# Patient Record
Sex: FEMALE | Race: AMERICAN INDIAN OR ALASKA NATIVE
[De-identification: names, ages, dates, MRNs, and addresses within clinical notes are randomized per-mention and may not be internally consistent; named-entity substitution may affect disease eponyms.]

---

## 2018-03-21 ENCOUNTER — HOSPITAL ENCOUNTER (EMERGENCY)
Dept: HOSPITAL 5 - ED | Age: 28
Discharge: HOME | End: 2018-03-21
Payer: COMMERCIAL

## 2018-03-21 VITALS — DIASTOLIC BLOOD PRESSURE: 70 MMHG | SYSTOLIC BLOOD PRESSURE: 122 MMHG

## 2018-03-21 DIAGNOSIS — F17.200: ICD-10-CM

## 2018-03-21 DIAGNOSIS — J06.9: Primary | ICD-10-CM

## 2018-03-21 DIAGNOSIS — J20.9: ICD-10-CM

## 2018-03-21 PROCEDURE — 87430 STREP A AG IA: CPT

## 2018-03-21 PROCEDURE — 96361 HYDRATE IV INFUSION ADD-ON: CPT

## 2018-03-21 PROCEDURE — 87116 MYCOBACTERIA CULTURE: CPT

## 2018-03-21 PROCEDURE — 99284 EMERGENCY DEPT VISIT MOD MDM: CPT

## 2018-03-21 PROCEDURE — 71046 X-RAY EXAM CHEST 2 VIEWS: CPT

## 2018-03-21 PROCEDURE — 96374 THER/PROPH/DIAG INJ IV PUSH: CPT

## 2018-03-21 PROCEDURE — 94640 AIRWAY INHALATION TREATMENT: CPT

## 2018-03-21 PROCEDURE — 96375 TX/PRO/DX INJ NEW DRUG ADDON: CPT

## 2018-03-21 NOTE — EMERGENCY DEPARTMENT REPORT
Blank Doc





- Documentation


Documentation: 





Patient is a 27-year-old  [place] female who is presenting with cough 

cold congestion body aches fever wheezing nausea vomiting and sore throat for 

the past 2 days.  Patient denies any diarrhea or headache at this time.  On 

brief physical exam patient is wheezing and has uncontrollable cough that 

sounds painful.  Patient will be moved to a treatment room to get IV fluids and 

nausea medicine and a breathing treatment and Solu-Medrol chest x-ray be 

reassessed.

## 2018-03-21 NOTE — EMERGENCY DEPARTMENT REPORT
HPI





- General


Chief Complaint: Upper Respiratory Infection


Time Seen by Provider: 03/21/18 15:07





- HPI


HPI: 





Patient airport cough, body ache, chills.  Says she's been ill since yesterday.

  Denies other medical problems.  Denies taking any medication.  Positive sore 

throat, nausea vomiting and productive cough.  Denies any headache or diarrhea.

  Denies any abdominal pain.  Denies any dizziness.  Denies any drooling.  

Patient said she also had nasal congestion and drainage which started about a 

week and since yesterday she started coughing and body ache and chills.  She 

says she took over-the-counter Tylenol Cold without any relief.  Last menstrual 

period was 03/21/2018.  Denies any chest pain or shortness of breath.  Body 

aches and sore throat was 5 out of 10.  Vomited times one today.





ED Past Medical Hx





- Past Medical History


Previous Medical History?: Yes


Hx Hypertension: No


Hx Congestive Heart Failure: No


Hx Diabetes: No


Hx Deep Vein Thrombosis: No


Hx Renal Disease: No


Hx Sickle Cell Disease: No


Hx Headaches / Migraines: Yes


Hx Seizures: No


Hx Asthma: No


Hx COPD: No


Hx HIV: No


Additional medical history: denies





- Surgical History


Past Surgical History?: No


Additional Surgical History: denies





- Family History


Family history: hypertension





- Social History


Smoking Status: Current Every Day Smoker


Substance Use Type: None





- Medications


Home Medications: 


 Home Medications











 Medication  Instructions  Recorded  Confirmed  Last Taken  Type


 


Butalb/Acetaminophen/Caffeine 1 cap PO Q6HR PRN #20 cap 08/11/16  Unknown Rx





[Fioricet -40 mg CAP]     


 


ALBUTEROL Inhaler [ProAir HFA 2 puff IH QID PRN #1 inhalation 03/21/18  Unknown 

Rx





Inhaler]     


 


Amoxicillin/K Clav Tab [Augmentin 1 tab PO Q12HR 10 Days #20 tab 03/21/18  

Unknown Rx





875 mg]     


 


Cetirizine HCl [ZyrTEC] 10 mg PO QDAY 14 Days #14 capsule 03/21/18  Unknown Rx


 


Fluticasone [Flonase] 1 spray NS QDAY 14 Days #1 bottle 03/21/18  Unknown Rx


 


Ibuprofen [Motrin 600 MG tab] 600 mg PO Q8H PRN #15 tablet 03/21/18  Unknown Rx


 


Prednisone [predniSONE 5 mg (6-Day 5 mg PO .TAPER #1 tab.ds.pk 03/21/18  

Unknown Rx





Pack, 21 Tabs)]     


 


Promethazine HCl/Codeine 5 ml PO Q6H PRN #118 syrup 03/21/18  Unknown Rx





[Promethazine-Codeine Syrup]     














ED Review of Systems


ROS: 


Stated complaint: CP/COLD/COUGHING


Other details as noted in HPI





Comment: All other systems reviewed and negative


Constitutional: chills, malaise


Eyes: denies: eye pain, eye discharge


ENT: throat pain, congestion.  denies: ear pain, dental pain


Respiratory: cough.  denies: orthopnea, shortness of breath, SOB with exertion, 

SOB at rest, stridor, wheezing


Cardiovascular: denies: chest pain, palpitations, dyspnea on exertion, orthopnea

, edema, syncope, paroxysmal nocturnal dyspnea


Gastrointestinal: nausea, vomiting.  denies: abdominal pain, diarrhea, 

constipation, hematemesis, melena, hematochezia


Genitourinary: denies: urgency, dysuria, frequency, hematuria, abnormal menses


Musculoskeletal: myalgia.  denies: back pain, joint swelling, arthralgia


Skin: denies: rash, pruritus


Neurological: denies: headache, weakness, numbness, paresthesias, confusion, 

abnormal gait, vertigo





Physical Exam





- Physical Exam


Vital Signs: 


 Vital Signs











  03/21/18





  13:26


 


Temperature 98.6 F


 


Pulse Rate 104 H


 


Respiratory 20





Rate 


 


Blood Pressure 122/69


 


O2 Sat by Pulse 100





Oximetry 











General: 





This is a 27-year-old female well-nourished.  Mild anxiety from illness.  

Nontoxic in appearance.


Physical Exam: 





Head: Normocephalic, atraumatic, no abrasion, no bruising and no contusion.


Eyes: Biateral pupils equal and reactive to light, bilateral EOM intact.. 

Bilateral conjunctival and sclera without injection, normal accommodation.  


Mouth: Mucosa dry, no pharyngeal exudate or erythema.  No peritonsillar 

abscesses.  Uvula is midline and oral airways patent.


Ears: Bilateral TMs congested without erythema , Bilateral EAC without any 

redness swelling or drainage.  No mastoid bone tenderness


Nose: Bilateral nasal mucosa congested with clear drainage,Maxillary and 

frontal sinuses non-tender to palpate.


Neck: Supple, No  Cervical adenopathy, full range of motion and no C-spine 

tenderness.  No swelling or tracheal deviation normal reflexes


Cardiovascular: S1, S2.  Mild tachycardia at 104 ,Regular  rhythm.  No murmur.  

Capillary refill is less then 3 seconds.


Lungs: Patient presents alone feel and congested cough.  No chest wall 

tenderness.  No chest contusion.  No bruising to chest.


MSK: Strength 5/5 in all extremities.  No joint deformity or crepitus.  Normal 

inspection.  Full range of motion to all extremities.  No laceration, abrasion 

or ecchymotic area noted.  


Abdomen: Non-tender to palpate in all quadrants, no guarding or rebound 

tenderness, positive bowel sounds in all quadrants.  No CVA tenderness.  No 

hernia, bruit or mass.  No rigidity or distention.


Extremities: No clubbing, cyanosis or edema.  +2 pulses.  No neurovascular 

compromise


Skin: Clean, dry and intact.  No rash or lesions.


Neurological: GCS at 15, Pt is alert and oriented 3 speech is clear . 


Psych: Normal mood and behavior





ED Course


 Vital Signs











  03/21/18





  13:26


 


Temperature 98.6 F


 


Pulse Rate 104 H


 


Respiratory 20





Rate 


 


Blood Pressure 122/69


 


O2 Sat by Pulse 100





Oximetry 








 Vital Signs











  03/21/18 03/21/18





  13:26 18:14


 


Temperature 98.6 F 


 


Pulse Rate 104 H 90


 


Respiratory 20 18





Rate  


 


Blood Pressure 122/69 


 


Blood Pressure  122/70





[Left]  


 


O2 Sat by Pulse 100 98





Oximetry  














- Reevaluation(s)


Reevaluation #1: 





03/21/18 18:21


Patient received albuterol nebulizer 10 mg , Atrovent 1 mg, Solu-Medrol 125 mg 

IV.  For cough and wheezing.  She received Zofran 4 mg IV for nausea and 

Toradol 30 mg IV for pain.  She also received hydrocodone 1 tablet by mouth 

also for pain.  She received 1 L normal saline bolus.  Upon her evaluation 

patient with very minimal wheezing to upper lung fields, cough has subsided and 

pain and nausea has resolved.  Patient says she is feeling a lot better.  Her 

vital signs are stable and she is afebrile.  Strep test is negative.





ED Medical Decision Making





- Lab Data





 Lab Results











  03/21/18 Range/Units





  17:30 


 


Group A Strep Rapid  Negative  (Negative)  








Strep culture pending





- Radiology Data


Radiology results: report reviewed





Chest x-ray revealed no acute cardiopulmonary findings





- Medical Decision Making





ED course: Patient presented to the emergency room complaining of worsening 

cold symptoms since yesterday.  She prior to that she's been having nasal 

congestion and runny nose.  Patient and will wheeze in and continuous cough and 

mild anxiety.  Patient with onset of respiratory Infection cough congestion, 

nausea and vomiting, musculoskeletal pain, acute bronchitis.  Strep test 

negative and culture pending.  Chest x-ray shows no acute cardiopulmonary 

processes.  Patient was given 10 mg albuterol and one Atrovent nebulizer, Solu-

Medrol 125 mg IV which relieved her wheezing and upper coughing.  She is having 

musculoskeletal pain and she received hydrocodone 1 tablet and Toradol 30 mg IV 

which pain has subsided.  She received IV fluid 1 L IV bolus, Zofran 4 mg IV 

for nausea and patient's much better.  Her vital signs remained stable 

throughout ED stay.  I discussed strep and x-ray results patient, diagnosis and 

treatment plan and need to follow-up.  She does not have a primary care 

physician so I'll go ahead and give her information for some outside Medical 

Center.  I discussed with her that she needs to call tomorrow to schedule an 

appointment for follow-up visit.  She voiced understanding and discharged home 

with prescription for albuterol, Zyrtec, Flonase, promethazine with codeine to 

help cough and nausea, Augmentin , Motrin and prednisone.


Critical care attestation.: 


If time is entered above; I have spent that time in minutes in the direct care 

of this critically ill patient, excluding procedure time.








ED Disposition


Clinical Impression: 


 Upper respiratory infection with cough and congestion, Musculoskeletal pain, 

Nausea and vomiting in adult





Acute bronchitis


Qualifiers:


 Bronchitis organism: unspecified organism Qualified Code(s): J20.9 - Acute 

bronchitis, unspecified





Pharyngitis


Qualifiers:


 Pharyngitis/tonsillitis etiology: unspecified etiology Qualified Code(s): 

J02.9 - Acute pharyngitis, unspecified





Disposition: DC-01 TO HOME OR SELFCARE


Is pt being admited?: No


Does the pt Need Aspirin: No


Condition: Stable


Instructions:  Acute Bronchitis (ED), Upper Respiratory Infection (ED), Acute 

Nausea and Vomiting (ED), Acute Cough (ED), Musculoskeletal Pain (ED)


Additional Instructions: 


Please increase her fluid intake to 2-3 L of water a day.


Takes Zyrtec and Flonase and this will help to relieve nasal congestion


Take Augmentin, albuterol and prednisone for acute bronchitis


Take Motrin for pain


Take promethazine with codeine for cough and nausea.  Please do not drive or 

operate heavy machinery while taking this medication as it causes drowsiness


Follow up with [place] in 2 days.





Prescriptions: 


ALBUTEROL Inhaler [ProAir HFA Inhaler] 2 puff IH QID PRN #1 inhalation


 PRN Reason: wheezing and cough


Amoxicillin/K Clav Tab [Augmentin 875 mg] 1 tab PO Q12HR 10 Days #20 tab


Cetirizine HCl [ZyrTEC] 10 mg PO QDAY 14 Days #14 capsule


Fluticasone [Flonase] 1 spray NS QDAY 14 Days #1 bottle


Ibuprofen [Motrin 600 MG tab] 600 mg PO Q8H PRN #15 tablet


 PRN Reason: Pain


Prednisone [predniSONE 5 mg (6-Day Pack, 21 Tabs)] 5 mg PO .TAPER #1 tab.ds.pk


Promethazine HCl/Codeine [Promethazine-Codeine Syrup] 5 ml PO Q6H PRN #118 syrup


 PRN Reason: nausea and  cough 


Referrals: 


[facility] [Outside] - 03/23/18


Forms:  Accompanied Note, Work/School Release Form(ED)

## 2018-07-01 ENCOUNTER — HOSPITAL ENCOUNTER (EMERGENCY)
Dept: HOSPITAL 5 - ED | Age: 28
Discharge: HOME | End: 2018-07-01
Payer: SELF-PAY

## 2018-07-01 DIAGNOSIS — R07.0: Primary | ICD-10-CM

## 2018-07-01 DIAGNOSIS — G43.909: ICD-10-CM

## 2018-07-01 DIAGNOSIS — Z87.891: ICD-10-CM

## 2018-07-01 DIAGNOSIS — R13.10: ICD-10-CM

## 2018-07-01 PROCEDURE — 96372 THER/PROPH/DIAG INJ SC/IM: CPT

## 2018-07-01 PROCEDURE — 87430 STREP A AG IA: CPT

## 2018-07-01 PROCEDURE — 87116 MYCOBACTERIA CULTURE: CPT

## 2018-07-01 PROCEDURE — 99283 EMERGENCY DEPT VISIT LOW MDM: CPT

## 2018-07-01 NOTE — EMERGENCY DEPARTMENT REPORT
<AMY MACK - Last Filed: 07/01/18 21:10>





ED ENT HPI





- General


Chief complaint: Sore Throat


Stated complaint: SORE THROAT,BODY ACHE


Time Seen by Provider: 07/01/18 21:06


Source: patient


Mode of arrival: Ambulatory


Limitations: No Limitations





- History of Present Illness


Initial comments: 


Patient is a 28-year-old female who presents for recurrent pharyngitis patient 

states pharyngitis resistive to penicillin usually treated with Augmentin 875 

by mouth twice a day for 10 days this flare for the last 3 days patient has not 

seen in ENT as directed states nocturnal fever Tmax 101.5 F oral patient is 

currently tolerating by mouth intake is no ear pain shortness of breath no 

stridor 





MD complaint: sore throat, difficulty swallowing


Onset/Timing: 3


-: days(s)


Severity: moderate


Severity scale (0 -10): 5


Quality: burning, sharp


Consistency: intermittent


Improves with: none


Worsens with: swallowing


Associated Symptoms: fever, pain with swallowing, sore throat





- Related Data


 Previous Rx's











 Medication  Instructions  Recorded  Last Taken  Type


 


Butalb/Acetaminophen/Caffeine 1 cap PO Q6HR PRN #20 cap 08/11/16 Unknown Rx





[Fioricet -40 mg CAP]    


 


ALBUTEROL Inhaler [ProAir HFA 2 puff IH QID PRN #1 inhalation 03/21/18 Unknown 

Rx





Inhaler]    


 


Amoxicillin/K Clav Tab [Augmentin 1 tab PO Q12HR 10 Days #20 tab 03/21/18 

Unknown Rx





875 mg]    


 


Cetirizine HCl [ZyrTEC] 10 mg PO QDAY 14 Days #14 capsule 03/21/18 Unknown Rx


 


Fluticasone [Flonase] 1 spray NS QDAY 14 Days #1 bottle 03/21/18 Unknown Rx


 


Ibuprofen [Motrin 600 MG tab] 600 mg PO Q8H PRN #15 tablet 03/21/18 Unknown Rx


 


Prednisone [predniSONE 5 mg (6-Day 5 mg PO .TAPER #1 tab.ds.pk 03/21/18 Unknown 

Rx





Pack, 21 Tabs)]    


 


Promethazine HCl/Codeine 5 ml PO Q6H PRN #118 syrup 03/21/18 Unknown Rx





[Promethazine-Codeine Syrup]    


 


Amoxicillin/Potassium Clav 1 each PO BID #20 tablet 07/01/18 Unknown Rx





[Augmentin 875-125 Tablet]    


 


Benzocaine/Menth/Cetylpyrd 8 each MM Q2H #3 packet 07/01/18 Unknown Rx





[Cepacol X Strength]    


 


Ibuprofen 800 mg PO TID PRN #30 tablet 07/01/18 Unknown Rx











 Allergies











Allergy/AdvReac Type Severity Reaction Status Date / Time


 


No Known Allergies Allergy   Verified 12/31/14 18:03














ED Dental HPI





- General


Chief complaint: Sore Throat


Stated complaint: SORE THROAT,BODY ACHE


Time Seen by Provider: 07/01/18 21:06


Source: patient


Mode of arrival: Ambulatory


Limitations: No Limitations





- Related Data


 Previous Rx's











 Medication  Instructions  Recorded  Last Taken  Type


 


Butalb/Acetaminophen/Caffeine 1 cap PO Q6HR PRN #20 cap 08/11/16 Unknown Rx





[Fioricet -40 mg CAP]    


 


ALBUTEROL Inhaler [ProAir HFA 2 puff IH QID PRN #1 inhalation 03/21/18 Unknown 

Rx





Inhaler]    


 


Amoxicillin/K Clav Tab [Augmentin 1 tab PO Q12HR 10 Days #20 tab 03/21/18 

Unknown Rx





875 mg]    


 


Cetirizine HCl [ZyrTEC] 10 mg PO QDAY 14 Days #14 capsule 03/21/18 Unknown Rx


 


Fluticasone [Flonase] 1 spray NS QDAY 14 Days #1 bottle 03/21/18 Unknown Rx


 


Ibuprofen [Motrin 600 MG tab] 600 mg PO Q8H PRN #15 tablet 03/21/18 Unknown Rx


 


Prednisone [predniSONE 5 mg (6-Day 5 mg PO .TAPER #1 tab.ds.pk 03/21/18 Unknown 

Rx





Pack, 21 Tabs)]    


 


Promethazine HCl/Codeine 5 ml PO Q6H PRN #118 syrup 03/21/18 Unknown Rx





[Promethazine-Codeine Syrup]    


 


Amoxicillin/Potassium Clav 1 each PO BID #20 tablet 07/01/18 Unknown Rx





[Augmentin 875-125 Tablet]    


 


Benzocaine/Menth/Cetylpyrd 8 each MM Q2H #3 packet 07/01/18 Unknown Rx





[Cepacol X Strength]    


 


Ibuprofen 800 mg PO TID PRN #30 tablet 07/01/18 Unknown Rx











 Allergies











Allergy/AdvReac Type Severity Reaction Status Date / Time


 


No Known Allergies Allergy   Verified 12/31/14 18:03














ED Review of Systems


ROS: 


Stated complaint: SORE THROAT,BODY ACHE


Other details as noted in HPI





Constitutional: fever


Eyes: denies: eye pain, eye discharge, vision change


ENT: throat pain


Respiratory: denies: cough, shortness of breath, wheezing


Cardiovascular: denies: chest pain, palpitations


Endocrine: no symptoms reported


Gastrointestinal: denies: abdominal pain, nausea, diarrhea


Genitourinary: denies: urgency, dysuria, discharge


Musculoskeletal: denies: back pain, joint swelling, arthralgia


Skin: as per HPI


Neurological: denies: headache, weakness, paresthesias


Psychiatric: denies: anxiety, depression


Hematological/Lymphatic: denies: easy bleeding, easy bruising





ED Past Medical Hx





- Past Medical History


Hx Hypertension: No


Hx Congestive Heart Failure: No


Hx Diabetes: No


Hx Deep Vein Thrombosis: No


Hx Renal Disease: No


Hx Sickle Cell Disease: No


Hx Headaches / Migraines: Yes


Hx Seizures: No


Hx Asthma: No


Hx COPD: No


Hx HIV: No


Additional medical history: denies





- Surgical History


Past Surgical History?: No


Additional Surgical History: denies





- Social History


Smoking Status: Former Smoker


Substance Use Type: None





- Medications


Home Medications: 


 Home Medications











 Medication  Instructions  Recorded  Confirmed  Last Taken  Type


 


Butalb/Acetaminophen/Caffeine 1 cap PO Q6HR PRN #20 cap 08/11/16  Unknown Rx





[Fioricet -40 mg CAP]     


 


ALBUTEROL Inhaler [ProAir HFA 2 puff IH QID PRN #1 inhalation 03/21/18  Unknown 

Rx





Inhaler]     


 


Amoxicillin/K Clav Tab [Augmentin 1 tab PO Q12HR 10 Days #20 tab 03/21/18  

Unknown Rx





875 mg]     


 


Cetirizine HCl [ZyrTEC] 10 mg PO QDAY 14 Days #14 capsule 03/21/18  Unknown Rx


 


Fluticasone [Flonase] 1 spray NS QDAY 14 Days #1 bottle 03/21/18  Unknown Rx


 


Ibuprofen [Motrin 600 MG tab] 600 mg PO Q8H PRN #15 tablet 03/21/18  Unknown Rx


 


Prednisone [predniSONE 5 mg (6-Day 5 mg PO .TAPER #1 tab.ds.pk 03/21/18  

Unknown Rx





Pack, 21 Tabs)]     


 


Promethazine HCl/Codeine 5 ml PO Q6H PRN #118 syrup 03/21/18  Unknown Rx





[Promethazine-Codeine Syrup]     


 


Amoxicillin/Potassium Clav 1 each PO BID #20 tablet 07/01/18  Unknown Rx





[Augmentin 875-125 Tablet]     


 


Benzocaine/Menth/Cetylpyrd 8 each MM Q2H #3 packet 07/01/18  Unknown Rx





[Cepacol X Strength]     


 


Ibuprofen 800 mg PO TID PRN #30 tablet 07/01/18  Unknown Rx














ED Physical Exam





- General


Limitations: No Limitations


General appearance: alert, in no apparent distress





- Head


Head exam: Present: atraumatic, normocephalic





- Eye


Eye exam: Present: normal appearance





- Expanded ENT Exam


  ** Expanded


Throat exam: Positive: tonsillar erythema, tonsillomegaly, tonsillar exudate.  

Negative: R peritonsillar mass, L peritonsillar mass





- Neck


Neck exam: Present: normal inspection, full ROM, lymphadenopathy.  Absent: 

tenderness, thyromegaly





- Expanded Neck Exam


  ** Expanded


Neck exam: Absent: tenderness, midline deformity, anterior neck swelling, 

thyroid mass





- Respiratory


Respiratory exam: Present: normal lung sounds bilaterally.  Absent: respiratory 

distress, wheezes, stridor, chest wall tenderness, prolonged expiratory





- Cardiovascular


Cardiovascular Exam: Present: regular rate, normal rhythm, normal heart sounds.

  Absent: systolic murmur, diastolic murmur, rubs, gallop





- GI/Abdominal


GI/Abdominal exam: Present: soft, normal bowel sounds.  Absent: distended, 

tenderness, guarding, rebound, rigid, hyperactive bowel sounds, organomegaly, 

mass, bruit, pulsatile mass, hernia





- Rectal


Rectal exam: Present: deferred





- Extremities Exam


Extremities exam: Present: normal inspection





- Back Exam


Back exam: Present: normal inspection





- Neurological Exam


Neurological exam: Present: alert, oriented X3, CN II-XII intact, normal gait, 

reflexes normal.  Absent: motor sensory deficit





- Psychiatric


Psychiatric exam: Present: normal affect, normal mood





- Skin


Skin exam: Present: warm, dry, intact, normal color.  Absent: rash





ED Medical Decision Making





- Medical Decision Making


Rapid strep negative Midline noted tonsillomegaly bilaterally white exudate no 

stridor airway is patent plan .  Pharyngitis Augmentin Decadron Cepacol throat 

lozenges ibuprofen when necessary pain fever follow up with ENT as directed 

patient verbalizes understanding and agreement with discharge plan we DC to 

home in stable condition at this time.





Critical care attestation.: 


If time is entered above; I have spent that time in minutes in the direct care 

of this critically ill patient, excluding procedure time.








ED Disposition


Disposition: DC-01 TO HOME OR SELFCARE


Is pt being admited?: No


Does the pt Need Aspirin: No


Condition: Good


Instructions:  Pharyngitis (ED)


Prescriptions: 


Amoxicillin/Potassium Clav [Augmentin 875-125 Tablet] 1 each PO BID #20 tablet


Benzocaine/Menth/Cetylpyrd [Cepacol X Strength] 8 each MM Q2H #3 packet


Ibuprofen 800 mg PO TID PRN #30 tablet


 PRN Reason: pain/ fever 


Referrals: 


TUYET LINARES MD [Staff Physician] - 3-5 Days


Forms:  Work/School Release Form(ED)


Time of Disposition: 21:21





<AKIN REYES - Last Filed: 07/02/18 20:01>





ED Medical Decision Making





- Medical Decision Making


I was available for consultations at all times during the patient stay. I did 

not personally see and was not involved in the care of the patient.

## 2018-08-22 ENCOUNTER — HOSPITAL ENCOUNTER (EMERGENCY)
Dept: HOSPITAL 5 - ED | Age: 28
LOS: 1 days | Discharge: HOME | End: 2018-08-23
Payer: SELF-PAY

## 2018-08-22 VITALS — DIASTOLIC BLOOD PRESSURE: 68 MMHG | SYSTOLIC BLOOD PRESSURE: 114 MMHG

## 2018-08-22 DIAGNOSIS — J18.1: Primary | ICD-10-CM

## 2018-08-22 DIAGNOSIS — G43.909: ICD-10-CM

## 2018-08-22 PROCEDURE — 87430 STREP A AG IA: CPT

## 2018-08-22 PROCEDURE — 87116 MYCOBACTERIA CULTURE: CPT

## 2018-08-22 PROCEDURE — 36415 COLL VENOUS BLD VENIPUNCTURE: CPT

## 2018-08-22 PROCEDURE — 80053 COMPREHEN METABOLIC PANEL: CPT

## 2018-08-22 PROCEDURE — 85025 COMPLETE CBC W/AUTO DIFF WBC: CPT

## 2018-08-22 PROCEDURE — 71046 X-RAY EXAM CHEST 2 VIEWS: CPT

## 2018-08-23 LAB
ALBUMIN SERPL-MCNC: 3.8 G/DL (ref 3.9–5)
ALT SERPL-CCNC: 9 UNITS/L (ref 7–56)
BASOPHILS # (AUTO): 0 K/MM3 (ref 0–0.1)
BASOPHILS NFR BLD AUTO: 0.4 % (ref 0–1.8)
BUN SERPL-MCNC: 8 MG/DL (ref 7–17)
BUN/CREAT SERPL: 10 %
CALCIUM SERPL-MCNC: 9.2 MG/DL (ref 8.4–10.2)
EOSINOPHIL # BLD AUTO: 0.5 K/MM3 (ref 0–0.4)
EOSINOPHIL NFR BLD AUTO: 6.1 % (ref 0–4.3)
HCT VFR BLD CALC: 36.3 % (ref 30.3–42.9)
HEMOLYSIS INDEX: 0
HGB BLD-MCNC: 11.8 GM/DL (ref 10.1–14.3)
LYMPHOCYTES # BLD AUTO: 2 K/MM3 (ref 1.2–5.4)
LYMPHOCYTES NFR BLD AUTO: 25.1 % (ref 13.4–35)
MCH RBC QN AUTO: 27 PG (ref 28–32)
MCHC RBC AUTO-ENTMCNC: 32 % (ref 30–34)
MCV RBC AUTO: 83 FL (ref 79–97)
MONOCYTES # (AUTO): 0.7 K/MM3 (ref 0–0.8)
MONOCYTES % (AUTO): 8.7 % (ref 0–7.3)
PLATELET # BLD: 191 K/MM3 (ref 140–440)
RBC # BLD AUTO: 4.38 M/MM3 (ref 3.65–5.03)

## 2018-08-23 NOTE — EMERGENCY DEPARTMENT REPORT
ED ENT HPI





- General


Chief complaint: Sore Throat


Stated complaint: THROAT AND BODY ACHES


Time Seen by Provider: 08/23/18 01:50


Source: patient


Mode of arrival: Ambulatory


Limitations: No Limitations





- History of Present Illness


MD complaint: sore throat


-: days(s) (1)





- Related Data


 Previous Rx's











 Medication  Instructions  Recorded  Last Taken  Type


 


Butalb/Acetaminophen/Caffeine 1 cap PO Q6HR PRN #20 cap 08/11/16 Unknown Rx





[Fioricet -40 mg CAP]    


 


ALBUTEROL Inhaler [ProAir HFA 2 puff IH QID PRN #1 inhalation 03/21/18 Unknown 

Rx





Inhaler]    


 


Amoxicillin/K Clav Tab [Augmentin 1 tab PO Q12HR 10 Days #20 tab 03/21/18 

Unknown Rx





875 mg]    


 


Cetirizine HCl [ZyrTEC] 10 mg PO QDAY 14 Days #14 capsule 03/21/18 Unknown Rx


 


Fluticasone [Flonase] 1 spray NS QDAY 14 Days #1 bottle 03/21/18 Unknown Rx


 


Ibuprofen [Motrin 600 MG tab] 600 mg PO Q8H PRN #15 tablet 03/21/18 Unknown Rx


 


Prednisone [predniSONE 5 mg (6-Day 5 mg PO .TAPER #1 tab.ds.pk 03/21/18 Unknown 

Rx





Pack, 21 Tabs)]    


 


Promethazine HCl/Codeine 5 ml PO Q6H PRN #118 syrup 03/21/18 Unknown Rx





[Promethazine-Codeine Syrup]    


 


Amoxicillin/Potassium Clav 1 each PO BID #20 tablet 07/01/18 Unknown Rx





[Augmentin 875-125 Tablet]    


 


Benzocaine/Menth/Cetylpyrd 8 each MM Q2H #3 packet 07/01/18 Unknown Rx





[Cepacol X Strength]    


 


Azithromycin [Zithromax Z-GISELLE] 250 mg PO QDAY #1 tablet 08/23/18 Unknown Rx


 


Ibuprofen 800 mg PO TID PRN #30 tablet 08/23/18 Unknown Rx











 Allergies











Allergy/AdvReac Type Severity Reaction Status Date / Time


 


No Known Allergies Allergy   Verified 12/31/14 18:03














ED Dental HPI





- General


Chief complaint: Sore Throat


Stated complaint: THROAT AND BODY ACHES


Time Seen by Provider: 08/23/18 01:50


Source: patient


Mode of arrival: Ambulatory


Limitations: No Limitations





- Related Data


 Previous Rx's











 Medication  Instructions  Recorded  Last Taken  Type


 


Butalb/Acetaminophen/Caffeine 1 cap PO Q6HR PRN #20 cap 08/11/16 Unknown Rx





[Fioricet -40 mg CAP]    


 


ALBUTEROL Inhaler [ProAir HFA 2 puff IH QID PRN #1 inhalation 03/21/18 Unknown 

Rx





Inhaler]    


 


Amoxicillin/K Clav Tab [Augmentin 1 tab PO Q12HR 10 Days #20 tab 03/21/18 

Unknown Rx





875 mg]    


 


Cetirizine HCl [ZyrTEC] 10 mg PO QDAY 14 Days #14 capsule 03/21/18 Unknown Rx


 


Fluticasone [Flonase] 1 spray NS QDAY 14 Days #1 bottle 03/21/18 Unknown Rx


 


Ibuprofen [Motrin 600 MG tab] 600 mg PO Q8H PRN #15 tablet 03/21/18 Unknown Rx


 


Prednisone [predniSONE 5 mg (6-Day 5 mg PO .TAPER #1 tab.ds.pk 03/21/18 Unknown 

Rx





Pack, 21 Tabs)]    


 


Promethazine HCl/Codeine 5 ml PO Q6H PRN #118 syrup 03/21/18 Unknown Rx





[Promethazine-Codeine Syrup]    


 


Amoxicillin/Potassium Clav 1 each PO BID #20 tablet 07/01/18 Unknown Rx





[Augmentin 875-125 Tablet]    


 


Benzocaine/Menth/Cetylpyrd 8 each MM Q2H #3 packet 07/01/18 Unknown Rx





[Cepacol X Strength]    


 


Azithromycin [Zithromax Z-GISELLE] 250 mg PO QDAY #1 tablet 08/23/18 Unknown Rx


 


Ibuprofen 800 mg PO TID PRN #30 tablet 08/23/18 Unknown Rx











 Allergies











Allergy/AdvReac Type Severity Reaction Status Date / Time


 


No Known Allergies Allergy   Verified 12/31/14 18:03














ED Review of Systems


ROS: 


Stated complaint: THROAT AND BODY ACHES


Other details as noted in HPI








ED Past Medical Hx





- Past Medical History


Previous Medical History?: Yes


Hx Hypertension: No


Hx Congestive Heart Failure: No


Hx Diabetes: No


Hx Deep Vein Thrombosis: No


Hx Renal Disease: No


Hx Sickle Cell Disease: No


Hx Headaches / Migraines: Yes


Hx Seizures: No


Hx Asthma: No


Hx COPD: No


Hx HIV: No


Additional medical history: denies





- Surgical History


Past Surgical History?: No


Additional Surgical History: denies





- Social History


Smoking Status: Never Smoker


Substance Use Type: None





- Medications


Home Medications: 


 Home Medications











 Medication  Instructions  Recorded  Confirmed  Last Taken  Type


 


Butalb/Acetaminophen/Caffeine 1 cap PO Q6HR PRN #20 cap 08/11/16  Unknown Rx





[Fioricet -40 mg CAP]     


 


ALBUTEROL Inhaler [ProAir HFA 2 puff IH QID PRN #1 inhalation 03/21/18  Unknown 

Rx





Inhaler]     


 


Amoxicillin/K Clav Tab [Augmentin 1 tab PO Q12HR 10 Days #20 tab 03/21/18  

Unknown Rx





875 mg]     


 


Cetirizine HCl [ZyrTEC] 10 mg PO QDAY 14 Days #14 capsule 03/21/18  Unknown Rx


 


Fluticasone [Flonase] 1 spray NS QDAY 14 Days #1 bottle 03/21/18  Unknown Rx


 


Ibuprofen [Motrin 600 MG tab] 600 mg PO Q8H PRN #15 tablet 03/21/18  Unknown Rx


 


Prednisone [predniSONE 5 mg (6-Day 5 mg PO .TAPER #1 tab.ds.pk 03/21/18  

Unknown Rx





Pack, 21 Tabs)]     


 


Promethazine HCl/Codeine 5 ml PO Q6H PRN #118 syrup 03/21/18  Unknown Rx





[Promethazine-Codeine Syrup]     


 


Amoxicillin/Potassium Clav 1 each PO BID #20 tablet 07/01/18  Unknown Rx





[Augmentin 875-125 Tablet]     


 


Benzocaine/Menth/Cetylpyrd 8 each MM Q2H #3 packet 07/01/18  Unknown Rx





[Cepacol X Strength]     


 


Azithromycin [Zithromax Z-GISELLE] 250 mg PO QDAY #1 tablet 08/23/18  Unknown Rx


 


Ibuprofen 800 mg PO TID PRN #30 tablet 08/23/18  Unknown Rx














ED Physical Exam





- General


Limitations: No Limitations





ED Course





 Vital Signs











  08/22/18





  23:43


 


Temperature 101.2 F H


 


Pulse Rate 103 H


 


Respiratory 14





Rate 


 


Blood Pressure 114/68


 


O2 Sat by Pulse 94





Oximetry 














ED Medical Decision Making





- Medical Decision Making





Patient has been evaluated by this provider in fast track.  


Patient was given Tylenol for pain management.


Throat culture came back negative for strep.


CBC CMP and chest x-ray order.


Chest x-ray came back with middle lobe infiltrate.


CBC, WBC is not elevated.


Patient able to drink and eat.  No difficulties breathing no shortness of 

breath.


We'll discharge patient on azithromycin and Motrin for pain.


Discussed patient if her symptoms persist or gets worse that she is to follow 

up with primary care provider.


I have listed [facility] [place] for her reference.


Patient verbalized understanding


Critical care attestation.: 


If time is entered above; I have spent that time in minutes in the direct care 

of this critically ill patient, excluding procedure time.








ED Disposition


Clinical Impression: 


Right middle lobe pneumonia


Qualifiers:


 Pneumonia type: due to unspecified organism Qualified Code(s): J18.1 - Lobar 

pneumonia, unspecified organism





Disposition: DC-01 TO HOME OR SELFCARE


Is pt being admited?: No


Does the pt Need Aspirin: No


Condition: Stable


Instructions:  Bacterial Pneumonia (ED)


Additional Instructions: 


Complete antibiotics as prescribed.  Pain medication as needed.  Increase her 

water intake by 2 L.  If your symptoms persist or gets worse please follow-up 

with your primary care provider.


Prescriptions: 


Azithromycin [Zithromax Z-GISELLE] 250 mg PO QDAY #1 tablet


Ibuprofen 800 mg PO TID PRN #30 tablet


 PRN Reason: pain/ fever 


Referrals: 


PRIMARY CARE,MD [Primary Care Provider] - 3-5 Days


Forms:  Work/School Release Form(ED), Accompanied Note

## 2018-08-23 NOTE — XRAY REPORT
FINAL REPORT



PROCEDURE:  XR CHEST ROUTINE 2V



TECHNIQUE:  PA and lateral chest radiographs were obtained. CPT

35073







HISTORY:  cough with fever 



COMPARISON:  No prior studies are available for comparison.



FINDINGS:  

Heart: Normal.



Mediastinum/Vessels: Normal.



Lungs/Pleural space: Lungs are expanded. There is a right middle

lobe infiltrate. There are no effusions or pneumothoraces..



Bony thorax: No acute osseous abnormality.



Other: 



IMPRESSION:  

Heart size is normal. There is a right middle lobe infiltrate..